# Patient Record
Sex: FEMALE | Race: WHITE | NOT HISPANIC OR LATINO | Employment: FULL TIME | URBAN - METROPOLITAN AREA
[De-identification: names, ages, dates, MRNs, and addresses within clinical notes are randomized per-mention and may not be internally consistent; named-entity substitution may affect disease eponyms.]

---

## 2021-01-12 ENCOUNTER — IMMUNIZATION (OUTPATIENT)
Dept: VACCINE CLINIC | Facility: HOSPITAL | Age: 36
End: 2021-01-12

## 2021-01-12 PROCEDURE — 0001A: CPT | Performed by: INTERNAL MEDICINE

## 2021-01-12 PROCEDURE — 91300 HC SARSCOV02 VAC 30MCG/0.3ML IM: CPT | Performed by: INTERNAL MEDICINE

## 2021-02-02 ENCOUNTER — IMMUNIZATION (OUTPATIENT)
Dept: VACCINE CLINIC | Facility: HOSPITAL | Age: 36
End: 2021-02-02

## 2021-02-02 PROCEDURE — 91300 HC SARSCOV02 VAC 30MCG/0.3ML IM: CPT | Performed by: INTERNAL MEDICINE

## 2021-02-02 PROCEDURE — 0002A: CPT | Performed by: INTERNAL MEDICINE

## 2022-05-11 ENCOUNTER — TRANSCRIBE ORDERS (OUTPATIENT)
Dept: ADMINISTRATIVE | Facility: HOSPITAL | Age: 37
End: 2022-05-11

## 2022-05-11 DIAGNOSIS — M85.80 OSTEOPENIA DETERMINED BY X-RAY: Primary | ICD-10-CM

## 2022-10-17 ENCOUNTER — LAB (OUTPATIENT)
Dept: LAB | Facility: HOSPITAL | Age: 37
End: 2022-10-17

## 2022-10-17 DIAGNOSIS — O09.522 SUPERVISION OF ELDERLY MULTIGRAVIDA IN SECOND TRIMESTER: Primary | ICD-10-CM

## 2022-10-17 PROCEDURE — 36415 COLL VENOUS BLD VENIPUNCTURE: CPT

## 2022-10-18 LAB — REF LAB TEST METHOD: NORMAL

## 2022-11-15 ENCOUNTER — APPOINTMENT (OUTPATIENT)
Dept: BONE DENSITY | Facility: HOSPITAL | Age: 37
End: 2022-11-15

## 2023-11-20 ENCOUNTER — HOSPITAL ENCOUNTER (OUTPATIENT)
Facility: HOSPITAL | Age: 38
Discharge: HOME | End: 2023-11-20
Payer: COMMERCIAL

## 2023-11-20 ENCOUNTER — PATIENT ROUNDING (BHMG ONLY) (OUTPATIENT)
Dept: INTERNAL MEDICINE | Facility: CLINIC | Age: 38
End: 2023-11-20
Payer: COMMERCIAL

## 2023-11-20 ENCOUNTER — OFFICE VISIT (OUTPATIENT)
Dept: INTERNAL MEDICINE | Facility: CLINIC | Age: 38
End: 2023-11-20
Payer: COMMERCIAL

## 2023-11-20 VITALS
HEIGHT: 65 IN | DIASTOLIC BLOOD PRESSURE: 80 MMHG | SYSTOLIC BLOOD PRESSURE: 122 MMHG | BODY MASS INDEX: 23.26 KG/M2 | HEART RATE: 72 BPM | WEIGHT: 139.6 LBS | OXYGEN SATURATION: 98 % | TEMPERATURE: 96.9 F

## 2023-11-20 DIAGNOSIS — R10.2 PELVIC PAIN: ICD-10-CM

## 2023-11-20 DIAGNOSIS — K59.00 CONSTIPATION, UNSPECIFIED CONSTIPATION TYPE: Primary | ICD-10-CM

## 2023-11-20 RX ORDER — CHLORCYCLIZINE HYDROCHLORIDE AND PSEUDOEPHEDRINE HYDROCHLORIDE 25; 60 MG/1; MG/1
TABLET ORAL
COMMUNITY
Start: 2023-10-19

## 2023-11-20 RX ORDER — FLUOROURACIL 50 MG/G
CREAM TOPICAL
COMMUNITY
Start: 2023-10-05

## 2023-11-20 RX ORDER — NORGESTIMATE AND ETHINYL ESTRADIOL 7DAYSX3 28
KIT ORAL
COMMUNITY
Start: 2022-12-16

## 2023-11-20 RX ORDER — LORATADINE 10 MG/1
10 TABLET ORAL DAILY
COMMUNITY

## 2023-11-20 NOTE — PROGRESS NOTES
"  New Patient Office Visit      Patient Name: Dalia Herndon  : 1985   MRN: [id number]   Care Team: Patient Care Team:  Bj Sinclair MD as PCP - General (Internal Medicine)    Chief Complaint:    Chief Complaint   Patient presents with    Constipation       History of Present Illness: Dalia Herndon is a 38 y.o. female who is here today as a new patient establishing care for chronic constipation    She states that for about the past year she has noticed worsening intermittent constipation with associated abdominal and pelvic pain.  She reports that has slowly been worsening and she has been taking MiraLAX, Dulcolax with minimal improvement.  She does report of some associated nausea and abdominal pain/indigestion that occurs with it.  She states that she can go a couple days without having a bowel movement and reports feeling minimal improvement after having the bowel movement.  She also reports that she feels that she could have more stool come out but is not able to strain and produce more. She denies any blood in stool, melena, but does have some pain with straining.     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 CMP, CBC, VitD, TSH, uric acid, GGT, Magnesium all wnl. A1c noted at 5.4%. She was able to schedule a colonoscopy given complaints above for 23 with [facility]. Additionally, she had a DEXA scan after she states she had an abnormal plain film of her back showing \"inverted sacrum\" and DEXA showed osteopenia.     PMH is notable for multiple skin cancers which she has had removed years past and currently has a Efudex cream for residual skin cancer. Denies any pertinent past surgical history. Family hx notable for breast cancer in mother, no family hx of autoimmune diseases or known cancers         Subjective      Review of Systems:   Review of Systems "   Constitutional:  Negative for fever, unexpected weight gain and unexpected weight loss.   Gastrointestinal:  Positive for abdominal distention, abdominal pain, constipation, nausea and rectal pain. Negative for anal bleeding, blood in stool, diarrhea and vomiting.   Genitourinary:  Negative for hematuria, menstrual problem, vaginal bleeding and vaginal discharge.   Musculoskeletal:  Positive for back pain.   Skin:         +hx of skin cancer    - See HPI    Past Medical History: History reviewed. No pertinent past medical history.    Past Surgical History:   Past Surgical History:   Procedure Laterality Date    TONSILLECTOMY  1989       Family History:   Family History   Problem Relation Age of Onset    Cancer Mother         Breast cancer    Miscarriages / Stillbirths Mother     Miscarriages / Stillbirths Maternal Grandmother        Social History:   Social History     Socioeconomic History    Marital status:    Tobacco Use    Smoking status: Never     Passive exposure: Never    Smokeless tobacco: Never   Vaping Use    Vaping Use: Never used   Substance and Sexual Activity    Alcohol use: Yes    Drug use: Never    Sexual activity: Yes     Partners: Male     Birth control/protection: Birth control pill, Same-sex partner       Tobacco History:   Social History     Tobacco Use   Smoking Status Never    Passive exposure: Never   Smokeless Tobacco Never       Medications:     Current Outpatient Medications:     fluorouracil (EFUDEX) 5 % cream, , Disp: , Rfl:     loratadine (CLARITIN) 10 MG tablet, Take 1 tablet by mouth Daily., Disp: , Rfl:     Stahist AD 25-60 MG tablet, 1 TABLET AS NEEDED ORALLY EVERY 8 HOURS AS NEEDED 30 DAYS, Disp: , Rfl:     Tri-Estarylla 0.18/0.215/0.25 MG-35 MCG per tablet, , Disp: , Rfl:     Allergies:   No Known Allergies    Objective     Physical Exam:  Vital Signs:   Vitals:    11/20/23 1548   BP: 122/80   Pulse: 72   Temp: 96.9 °F (36.1 °C)   TempSrc: Temporal   SpO2: 98%   Weight:  "63.3 kg (139 lb 9.6 oz)   Height: 165.1 cm (65\")     Body mass index is 23.23 kg/m².     Physical Exam  Vitals reviewed.   Constitutional:       General: She is not in acute distress.     Appearance: Normal appearance.   HENT:      Head: Normocephalic and atraumatic.   Eyes:      General:         Right eye: No discharge.         Left eye: No discharge.      Extraocular Movements: Extraocular movements intact.      Conjunctiva/sclera: Conjunctivae normal.   Cardiovascular:      Rate and Rhythm: Normal rate and regular rhythm.      Heart sounds: Normal heart sounds.   Pulmonary:      Effort: Pulmonary effort is normal. No respiratory distress.      Breath sounds: Normal breath sounds.   Abdominal:      General: Abdomen is flat. Bowel sounds are normal. There is no distension.      Palpations: Abdomen is soft. There is no mass.      Tenderness: There is no abdominal tenderness. There is no right CVA tenderness or left CVA tenderness.   Musculoskeletal:         General: No swelling or deformity. Normal range of motion.      Cervical back: Normal range of motion and neck supple.   Skin:     General: Skin is warm and dry.   Neurological:      General: No focal deficit present.      Mental Status: She is alert and oriented to person, place, and time.      Motor: No weakness.   Psychiatric:         Mood and Affect: Mood normal.         Behavior: Behavior normal.         Assessment / Plan      Assessment/Plan:   Problems Addressed This Visit  Diagnoses and all orders for this visit:    1. Constipation, unspecified constipation type (Primary)  -     Comprehensive Metabolic Panel  -     CBC & Differential  -     TSH Rfx On Abnormal To Free T4    2. Pelvic pain  -     Cancel: XR Pelvis 3+ View  -     Sedimentation rate, automated  -     C-reactive protein  -     XR Pelvis 1 or 2 View      Presents with 1yr hx of worsening constipation with associated nausea, unlocalized abdominal pain refractory to OTC laxatives with no other " red flag sx/s. Review of outside labs show no signs of thyroid dysfunction, T2DM, electrolyte abnormalities. Hs-CRP was elevated however unclear significance of this. Counseled to increase fiber intake, continue daily laxatives. Agreed with pursuing colonoscopy which is already scheduled and will f/u results    Will plan to repeat CMP, CBC and TSH today and will additionally obtain ESR/CRP. She had plain films that demonstrated sacral abnormalities and subsequently had DEXA that showed osteopenia with no obvious explanation given her age and absent risk factors for developing so. Will repeat plain films today to evaluate for spondyloarthritis as well as inflammatory markers above for possible IBD as etiology as she mentioned previously reported episode of back/pelvic pain.     F/u in 1mo, if ultimately negative will consider CT imaging or referral to GI     Plan of care reviewed with patient at the conclusion of today's visit. Education was provided regarding diagnosis and management.  Patient verbalizes understanding of and agreement with management plan.      Follow Up:   Return in about 1 month (around 12/20/2023).          MD [facility]  [address] [place] [phone]  Fax [phone]

## 2023-11-20 NOTE — PROGRESS NOTES
November 20, 2023    Hello, may I speak with Dalia Herndon?    My name is Shade      I am  with [facility]  [address] [address]  [facility] [id number].    Before we get started may I verify your date of birth? 1985    I am calling to officially welcome you to our practice and ask about your recent visit. Is this a good time to talk? yes    Tell me about your visit with us. What things went well?  everything       We're always looking for ways to make our patients' experiences even better. Do you have recommendations on ways we may improve?  no    Overall were you satisfied with your first visit to our practice? yes       I appreciate you taking the time to speak with me today. Is there anything else I can do for you? no      Thank you, and have a great day.

## 2023-12-20 ENCOUNTER — OFFICE VISIT (OUTPATIENT)
Dept: INTERNAL MEDICINE | Facility: CLINIC | Age: 38
End: 2023-12-20
Payer: COMMERCIAL

## 2023-12-20 VITALS
OXYGEN SATURATION: 98 % | DIASTOLIC BLOOD PRESSURE: 70 MMHG | HEIGHT: 65 IN | WEIGHT: 137.8 LBS | BODY MASS INDEX: 22.96 KG/M2 | TEMPERATURE: 98.2 F | HEART RATE: 73 BPM | SYSTOLIC BLOOD PRESSURE: 122 MMHG

## 2023-12-20 DIAGNOSIS — N81.6 RECTOCELE: Primary | ICD-10-CM

## 2023-12-20 DIAGNOSIS — K64.8 INTERNAL HEMORRHOIDS: ICD-10-CM

## 2023-12-20 PROCEDURE — 99213 OFFICE O/P EST LOW 20 MIN: CPT | Performed by: STUDENT IN AN ORGANIZED HEALTH CARE EDUCATION/TRAINING PROGRAM

## 2023-12-20 NOTE — PROGRESS NOTES
"Chief Complaint  Rectal Prolapse and Hemorrhoids    Subjective        Dalia Herndon presents to [facility]  History of Present Illness    Ms. Herndon presents to clinic today for follow up of change in bowel habits after recent colonoscopy performed.    She had colonoscopy performed since last visit and was noted to have rectocele as well as internal hemorrhoids. The GI doctor at that time recommended having surgical evaluation for the rectocele in which she is interested in.       Review of Systems   Gastrointestinal:  Positive for constipation and rectal pain. Negative for abdominal pain, diarrhea and vomiting.        Objective   Vital Signs:  /70   Pulse 73   Temp 98.2 °F (36.8 °C) (Temporal)   Ht 165.1 cm (65\")   Wt 62.5 kg (137 lb 12.8 oz)   SpO2 98%   BMI 22.93 kg/m²   Estimated body mass index is 22.93 kg/m² as calculated from the following:    Height as of this encounter: 165.1 cm (65\").    Weight as of this encounter: 62.5 kg (137 lb 12.8 oz).       BMI is within normal parameters. No other follow-up for BMI required.      Physical Exam  Vitals reviewed.   Constitutional:       Appearance: Normal appearance. She is not ill-appearing.   HENT:      Head: Normocephalic and atraumatic.   Eyes:      General: No scleral icterus.     Extraocular Movements: Extraocular movements intact.      Conjunctiva/sclera: Conjunctivae normal.   Pulmonary:      Effort: Pulmonary effort is normal. No respiratory distress.   Musculoskeletal:         General: No swelling or deformity. Normal range of motion.   Skin:     General: Skin is warm and dry.   Neurological:      General: No focal deficit present.      Mental Status: She is alert and oriented to person, place, and time.      Motor: No weakness.      Gait: Gait normal.   Psychiatric:         Mood and Affect: Mood normal.         Behavior: Behavior normal.        Result Review :                   Assessment and Plan "   Diagnoses and all orders for this visit:    1. Rectocele (Primary)  -     Ambulatory Referral to Colorectal Surgery  -     Ambulatory Referral to Physical Therapy Pelvic Floor    2. Internal hemorrhoids  -     Ambulatory Referral to Colorectal Surgery      Reviewed recent colonoscopy and labs obtained at outside facility, scanned in.    Recent c-scope noted rectocele and internal hemorrhoids. She is interested in surgical intervention given her age so will refer to colorectal for possible surgical intervention. Will also refer to PT for pelvic floor    Labs show no signs of anemia, counseled on increasing fiber supplement, staying hydrated for internal hemorrhoid treatment. Referral to colorectal            Follow Up   Return in about 4 months (around 4/20/2024).  Patient was given instructions and counseling regarding her condition or for health maintenance advice. Please see specific information pulled into the AVS if appropriate.

## 2023-12-29 DIAGNOSIS — N81.6 RECTOCELE: Primary | ICD-10-CM

## 2024-02-12 ENCOUNTER — OFFICE VISIT (OUTPATIENT)
Dept: SURGERY | Facility: CLINIC | Age: 39
End: 2024-02-12
Payer: COMMERCIAL

## 2024-02-12 VITALS
HEART RATE: 77 BPM | WEIGHT: 139 LBS | HEIGHT: 65 IN | SYSTOLIC BLOOD PRESSURE: 118 MMHG | OXYGEN SATURATION: 99 % | DIASTOLIC BLOOD PRESSURE: 70 MMHG | BODY MASS INDEX: 23.16 KG/M2

## 2024-02-12 DIAGNOSIS — K59.09 OTHER CONSTIPATION: Primary | ICD-10-CM

## 2024-02-12 DIAGNOSIS — N81.6 RECTOCELE: ICD-10-CM

## 2024-02-12 DIAGNOSIS — K60.2 ANAL FISSURE: ICD-10-CM

## 2024-02-16 ENCOUNTER — PATIENT ROUNDING (BHMG ONLY) (OUTPATIENT)
Dept: SURGERY | Facility: CLINIC | Age: 39
End: 2024-02-16
Payer: COMMERCIAL

## 2024-04-17 ENCOUNTER — OFFICE VISIT (OUTPATIENT)
Dept: INTERNAL MEDICINE | Facility: CLINIC | Age: 39
End: 2024-04-17
Payer: COMMERCIAL

## 2024-04-17 VITALS
HEART RATE: 62 BPM | HEIGHT: 65 IN | SYSTOLIC BLOOD PRESSURE: 108 MMHG | DIASTOLIC BLOOD PRESSURE: 82 MMHG | OXYGEN SATURATION: 95 % | WEIGHT: 139.2 LBS | BODY MASS INDEX: 23.19 KG/M2 | TEMPERATURE: 97.8 F

## 2024-04-17 DIAGNOSIS — N81.6 RECTOCELE: ICD-10-CM

## 2024-04-17 DIAGNOSIS — K59.00 CONSTIPATION, UNSPECIFIED CONSTIPATION TYPE: Primary | ICD-10-CM

## 2024-04-17 PROCEDURE — 99213 OFFICE O/P EST LOW 20 MIN: CPT | Performed by: STUDENT IN AN ORGANIZED HEALTH CARE EDUCATION/TRAINING PROGRAM

## 2024-04-17 RX ORDER — HYDROCORTISONE 25 MG/G
CREAM TOPICAL
COMMUNITY
Start: 2023-12-06

## 2024-04-17 RX ORDER — FAMOTIDINE 20 MG
TABLET ORAL
COMMUNITY

## 2024-04-17 NOTE — PROGRESS NOTES
"Chief Complaint  Constipation and Rectal Prolapse    Subjective        Dalia Vishnu Herndon presents to [facility]  History of Present Illness    Ms. Herndon presents to clinic today for follow-up of chronic constipation and rectocele    She reports she was recently seen by urogynecology at [place] who ultimately did not recommend surgical intervention at this time.  She has instead been attending pelvic floor physical therapy which has been helpful and beneficial    She also established with colorectal here at [facility] and was noted to have anal fissure in which topical lidocaine cream was prescribed.  She has been taking fiber supplement daily as well as taking a magnesium supplement (Magnesium Calm(?)) per the recommendations of her urogyenocologist and has had improvement.       Review of Systems   Constitutional:  Negative for fever.   Gastrointestinal:  Positive for constipation and rectal pain. Negative for blood in stool.        Objective   Vital Signs:  /82   Pulse 62   Temp 97.8 °F (36.6 °C) (Temporal)   Ht 165.1 cm (65\")   Wt 63.1 kg (139 lb 3.2 oz)   SpO2 95%   BMI 23.16 kg/m²   Estimated body mass index is 23.16 kg/m² as calculated from the following:    Height as of this encounter: 165.1 cm (65\").    Weight as of this encounter: 63.1 kg (139 lb 3.2 oz).       BMI is within normal parameters. No other follow-up for BMI required.      Physical Exam  Vitals reviewed.   Constitutional:       General: She is not in acute distress.     Appearance: Normal appearance. She is not toxic-appearing.   HENT:      Head: Normocephalic and atraumatic.   Eyes:      General: No scleral icterus.     Conjunctiva/sclera: Conjunctivae normal.   Abdominal:      General: Abdomen is flat. There is no distension.      Palpations: Abdomen is soft.   Skin:     General: Skin is warm and dry.   Neurological:      Mental Status: She is alert and oriented to person, place, and time. "   Psychiatric:         Mood and Affect: Mood normal.         Behavior: Behavior normal.        Result Review :                   Assessment and Plan   Diagnoses and all orders for this visit:    1. Constipation, unspecified constipation type (Primary)    2. Rectocele      Reviewed outside urogyenocology and colorectal OV since last visit with me    Constipation is improving with daily fiber supplementation and varoius laxatives and supplements. Discussed staying hydrated and that I can't definitively say supplement is beneficial given unclear ingredients but I suspect it should appropriate to take and without significant risks.     Rectocele is improving with pelvic floor PT. Appears to also have some anterior prolapse which is exacerbating above. Continue f/w urogynecology and pelvic floor PT         Follow Up   Return in about 6 months (around 10/17/2024) for Annual physical.  Patient was given instructions and counseling regarding her condition or for health maintenance advice. Please see specific information pulled into the AVS if appropriate.

## 2024-11-19 ENCOUNTER — OFFICE VISIT (OUTPATIENT)
Dept: INTERNAL MEDICINE | Facility: CLINIC | Age: 39
End: 2024-11-19
Payer: COMMERCIAL

## 2024-11-19 VITALS
HEART RATE: 59 BPM | WEIGHT: 137.4 LBS | TEMPERATURE: 97.3 F | OXYGEN SATURATION: 99 % | DIASTOLIC BLOOD PRESSURE: 84 MMHG | BODY MASS INDEX: 22.89 KG/M2 | SYSTOLIC BLOOD PRESSURE: 122 MMHG | HEIGHT: 65 IN

## 2024-11-19 DIAGNOSIS — Z00.00 ANNUAL PHYSICAL EXAM: Primary | ICD-10-CM

## 2024-11-19 DIAGNOSIS — R74.8 ELEVATED ALKALINE PHOSPHATASE LEVEL: ICD-10-CM

## 2024-11-19 PROCEDURE — 99396 PREV VISIT EST AGE 40-64: CPT | Performed by: STUDENT IN AN ORGANIZED HEALTH CARE EDUCATION/TRAINING PROGRAM

## 2024-11-19 NOTE — PROGRESS NOTES
"Chief Complaint  Annual Exam    Subjective        Dalia Herndon presents to [facility]  History of Present Illness    Presents to clinic today for CPE    She had labs done at outside lab and brought in. All look reassuring with no signs of anemia, did note elevated LDL at 112. Otherwise she has been doing well     Review of Systems   Constitutional:  Negative for appetite change, fever and unexpected weight change.   HENT:  Negative for congestion, postnasal drip and rhinorrhea.    Eyes:  Negative for photophobia and visual disturbance.   Respiratory:  Negative for chest tightness and shortness of breath.    Cardiovascular:  Negative for chest pain and palpitations.   Gastrointestinal:  Negative for abdominal pain, blood in stool, diarrhea and vomiting.   Endocrine: Negative for polydipsia and polyuria.   Genitourinary:  Negative for dysuria, flank pain and hematuria.   Musculoskeletal:  Positive for arthralgias (hip pain). Negative for gait problem.   Skin:  Negative for rash and wound.   Neurological:  Negative for seizures, syncope, weakness and headaches.   Psychiatric/Behavioral:  Negative for sleep disturbance and suicidal ideas. The patient is not nervous/anxious.        Objective   Vital Signs:  /84   Pulse 59   Temp 97.3 °F (36.3 °C) (Temporal)   Ht 165.1 cm (65\")   Wt 62.3 kg (137 lb 6.4 oz)   SpO2 99%   BMI 22.86 kg/m²   Estimated body mass index is 22.86 kg/m² as calculated from the following:    Height as of this encounter: 165.1 cm (65\").    Weight as of this encounter: 62.3 kg (137 lb 6.4 oz).    BMI is within normal parameters. No other follow-up for BMI required.      Physical Exam  Vitals reviewed.   Constitutional:       General: She is not in acute distress.     Appearance: Normal appearance. She is not toxic-appearing.   HENT:      Head: Normocephalic and atraumatic.      Nose: No congestion.   Eyes:      General: No scleral icterus.     " Conjunctiva/sclera: Conjunctivae normal.   Cardiovascular:      Rate and Rhythm: Normal rate and regular rhythm.      Heart sounds: Normal heart sounds.   Pulmonary:      Effort: Pulmonary effort is normal. No respiratory distress.      Breath sounds: Normal breath sounds. No wheezing.   Abdominal:      General: Abdomen is flat.      Palpations: Abdomen is soft.      Tenderness: There is no guarding.   Skin:     General: Skin is warm and dry.   Neurological:      Mental Status: She is alert and oriented to person, place, and time.      Motor: No weakness.      Gait: Gait normal.   Psychiatric:         Mood and Affect: Mood normal.         Behavior: Behavior normal.        Result Review :                Assessment and Plan   Diagnoses and all orders for this visit:    1. Annual physical exam (Primary)    2. Elevated alkaline phosphatase level      Immunizations: due for flu, she will get at her work  Cancer screening: UTD, f/w OBGYN   Metabolic Labs: UTD, scanned in  Recommend maintaining a healthy lifestyle, rich in whole grains, fruits and vegetables. Limit high saturated fats and processed sugars. Maintain an active lifestyle with goal of 150 min of moderate aerobic exercise per week      Will plan just to touch base with her in 6mo, she will have repeat CMP lab drawn to repeat alk phos which was noted to be mildly elevated at around 140.    HLD is chronic and new, recommend heart-healthy diet rich in fruits and vegetables, whole grains, and lean proteins. Limit carbohydrates and saturated fats as these can elevate your cholesterol and exercise regularly      RTC in 6mo or sooner prn        Follow Up   Return in about 6 months (around 5/19/2025).  Patient was given instructions and counseling regarding her condition or for health maintenance advice. Please see specific information pulled into the AVS if appropriate.

## 2024-11-22 ENCOUNTER — TELEPHONE (OUTPATIENT)
Dept: SURGERY | Facility: CLINIC | Age: 39
End: 2024-11-22
Payer: COMMERCIAL
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
## 2024-12-05 ENCOUNTER — TELEPHONE (OUTPATIENT)
Dept: INTERNAL MEDICINE | Facility: CLINIC | Age: 39
End: 2024-12-05
Payer: COMMERCIAL
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
## 2025-02-06 NOTE — PROGRESS NOTES
[facility]     Referring Provider: Bj Sinclair MD     Chief complaint: management of breast cancer risk     HPI: Ms. Dalia Herndon is a 38 yo woman, seen at the request of Bj Sinclair MD, for management of breast cancer risk    I personally reviewed her records and summarized her relevant breast history/imaging:    10/15/2024 bilateral screening mammogram at all women  The breasts are heterogeneously dense, which may obscure small masses.  No suspicious masses, significant calcifications or other abnormalities are seen.  IMPRESSION:  There is no mammographic evidence of malignancy.  Screening mammogram in 1 year is recommended.  BI-RADS Category 1         She has a family history of breast cancer in her mother dx age 49.  She denies any family history of ovarian cancer.     Today she presents with concerns regarding family history of breast cancer in her mother and if she is high risk.  She denies any breast lumps, pain, skin changes, or nipple discharge.  She denies any prior history of abnormal mammograms or breast biopsies.       Review of Systems - Oncology    Medications:    Current Outpatient Medications:     fluorouracil (EFUDEX) 5 % cream, , Disp: , Rfl:     Hydrocortisone, Perianal, (ANUSOL-HC) 2.5 % rectal cream, 1 APPLICATION EXTERNALLY TWICE A DAY 30 DAYS, Disp: , Rfl:     loratadine (CLARITIN) 10 MG tablet, Take 1 tablet by mouth Daily., Disp: , Rfl:     Magnesium Carbonate 250 MG/GM powder, Take  by mouth. (Patient not taking: Reported on 11/19/2024), Disp: , Rfl:     Stahist AD 25-60 MG tablet, , Disp: , Rfl:     Tri-Estarylla 0.18/0.215/0.25 MG-35 MCG per tablet, , Disp: , Rfl:     Vitamin D, Cholecalciferol, 25 MCG (1000 UT) capsule, Take  by mouth., Disp: , Rfl:     Allergies:  No Known Allergies    Medical history:  No past medical history on file.    Surgical History:  Past Surgical History:   Procedure Laterality Date    TONSILLECTOMY  1989       Family History:  Family  History   Problem Relation Age of Onset    Cancer Mother         Breast cancer    Miscarriages / Stillbirths Mother     No Known Problems Father     Miscarriages / Stillbirths Maternal Grandmother        Social History:   Social History     Socioeconomic History    Marital status:    Tobacco Use    Smoking status: Never     Passive exposure: Never    Smokeless tobacco: Never   Vaping Use    Vaping status: Never Used   Substance and Sexual Activity    Alcohol use: Yes    Drug use: Never    Sexual activity: Yes     Partners: Male     Birth control/protection: Birth control pill, Partner of same sex     Patient drinks 2 servings of caffeine per day.       GYNECOLOGIC HISTORY:   . P: 1. AB: 3.  Last menstrual period: 2025  Age at menarche: 13  Age at first childbirth: 34  Lactation/How lon months  Age at menopause: N/A  Total years of oral contraceptive use: 19 years  Total years of hormone replacement therapy: N/A      Physical Exam  There were no vitals filed for this visit.  ECOG 0 - Asymptomatic  General: NAD, well appearing  Psych: a&o x 3, normal mood and affect  Eyes: EOMI, no scleral icterus  ENMT: neck supple without masses or thyromegaly, mucus membranes moist  Resp: normal effort, CTAB  CV: RRR, no murmurs, no edema   GI: soft, NT, ND  MSK: normal gait, normal ROM in bilateral shoulders  Lymph nodes: no cervical, supraclavicular or axillary lymphadenopathy  Breast: symmetric, small  Right: No visible abnormalities on inspection while seated, with arms raised or hands on hips. No masses, skin changes, or nipple abnormalities.  Left: No visible abnormalities on inspection while seated, with arms raised or hands on hips. No masses, skin changes, or nipple abnormalities.      Assessment:    Family history of breast cancer  High risk for breast cancer-according to [facility] is a lifetime risk of 22.8%  Dense breast    Discussion:  We discussed management options for individuals who are at increased  risk (>20% lifetime risk):  1) High risk screening - Annual mammogram and annual breast MRI, alternating one test every 6 months, biannual clinical exam and monthly self breast exam. She meets criteria for high risk screening.  2) Chemoprevention with Tamoxifen, Raloxifene or Exemestane. These may reduce risk up to 50%. I reviewed that these particular medications are not without risks and the risk/benefit ratio must be considered carefully. She is not interested in this currently.  3) Risk reducing surgery such as prophylactic mastectomy  which may reduce risk by 90-95%. We discussed that this is a relatively radical strategy and is generally reserved for individuals with a known genetic mutation predisposing them to an increased risk (~50% risk) of breast cancer. She does not meet criteria for risk reducing surgery.   4) I discussed the importance of exercise and weight management as part of a risk reducing strategy, since increased BMI is associated with an increased risk of breast cancer. This is especially true in her case, as I expect her risk would decrease as she lost weight.  2. Breast density describes how the breasts look on a mammogram.  Breast and connective tissue are denser than fat and this difference shows up on the mammogram.  Young women often have dense breasts.  As we age, breast become less dense.  Dense breast can make it harder to find breast cancer on the mammogram.  Women with high breast density have an increased risk of breast cancer.  Educational materials regarding breast density were given and reviewed.  Tomosynthesis imaging will be completed with next screening study.  3. In reviewing the patient's personal and family history of cancer, including the number, types and age of diagnosis, we found that the patient is eligible for genetic testing based on the National comprehensive cancer network (NCCN) guidelines.  The patient is aware that testing may reveal a pathogenic variant in any  1 of several genes, and that a pathogenic variant can markedly increase the risk of variety of cancers.  The types of cancers in the level of risk is dependent on which gene is affected.  The patient is also aware that we may find no pathogenic variants or that we could find what is called a variant of uncertain significance(VUS), which at this point in time has no direct impact on medical care.  In either these cases, risk would be determined by the family history alone, which would determine further management.  The patient knows that if we find a pathogenic variant, that her family members are also at risk and they should be informed of this information.    The purpose of the test is to identify whether you carry a gene variant (mutation) that might increase your risk of developing cancer or other diseases.    The reliability of positive or negative test results and the level of certainty that a positive test result for that disease or condition serves as a predictor of such disease: This test is not perfect.  If you carry a gene variant (mutation) and a cancer causing gene and increases your risk of cancer but does not mean you develop cancer.  Also, if you do not carry a gene variant (mutation), it does not mean you will not get cancer.  This test will help us better manage you in order to help find cancer earlier or prevent it.  If you develop or have cancer, it may help us better treat that disease.    If you do carry a pathogenic gene variant (mutation), you will be given a management strategy to help minimize your risk and we will discuss what it means for you.  You are being tested for multiple genes, and each has its own set of cancers that it predisposes to in each cancer gene combination shows a different level of risk.  If you carry a gene variant(mutation) we have discussed the specific risk of cancer for that gene and arrange management that minimizes your future risk.    You may also be found to  have a change in imaging that we do not understand (variant of uncertain significance (VUS)).  We will let you know and then manage this is a negative result.  Most of these turn out to be benign as we learn more.     For those patients who do not have cancer, a positive test can compromise the ability to obtain health and disability insurance, so your coverage should be satisfactory to you prior to testing.    Cost: Your insurance will likely cover the cost of genetic testing.  If you are insured denies payment, the testing lab will contact you to see if you are willing to pay out-of-pocket.  If so, the maximum cost is around $200-$250.  If you are not willing to pay in your insurance or denies payment, the test will be canceled.    The patient agreed to proceed with testing and a 74 gene [employer] panel was ordered.        Plan:  Genetic testing at next visit if her mother does not get gentics  MRI in April call with results  Mammo in oct followed by exam        TOMMIE Cantor    I have spent 45 mins in face to face time with the patient and in chart review.    CC:  MD Yahir Silva Conor, MD    EMR Dragon/transcription disclaimer:  Dictated using Dragon dictation

## 2025-02-07 ENCOUNTER — OFFICE VISIT (OUTPATIENT)
Dept: SURGERY | Facility: CLINIC | Age: 40
End: 2025-02-07
Payer: COMMERCIAL

## 2025-02-07 VITALS
HEART RATE: 101 BPM | WEIGHT: 135 LBS | DIASTOLIC BLOOD PRESSURE: 84 MMHG | OXYGEN SATURATION: 98 % | HEIGHT: 65 IN | BODY MASS INDEX: 22.49 KG/M2 | SYSTOLIC BLOOD PRESSURE: 130 MMHG

## 2025-02-07 DIAGNOSIS — Z91.89 AT HIGH RISK FOR BREAST CANCER: Primary | ICD-10-CM

## 2025-02-07 DIAGNOSIS — Z12.31 ENCOUNTER FOR SCREENING MAMMOGRAM FOR MALIGNANT NEOPLASM OF BREAST: ICD-10-CM

## 2025-02-07 RX ORDER — IMIQUIMOD 12.5 MG/.25G
CREAM TOPICAL
COMMUNITY
Start: 2025-01-08

## 2025-04-09 ENCOUNTER — HOSPITAL ENCOUNTER (OUTPATIENT)
Dept: MRI IMAGING | Facility: HOSPITAL | Age: 40
Discharge: HOME | End: 2025-04-09
Admitting: NURSE PRACTITIONER
Payer: COMMERCIAL

## 2025-04-09 DIAGNOSIS — Z91.89 AT HIGH RISK FOR BREAST CANCER: ICD-10-CM

## 2025-04-09 PROCEDURE — 77049 MRI BREAST C-+ W/CAD BI: CPT

## 2025-04-09 PROCEDURE — 25510000002 GADOBENATE DIMEGLUMINE 529 MG/ML SOLUTION: Performed by: NURSE PRACTITIONER

## 2025-04-09 PROCEDURE — A9577 INJ MULTIHANCE: HCPCS | Performed by: NURSE PRACTITIONER

## 2025-04-09 RX ADMIN — GADOBENATE DIMEGLUMINE 12 ML: 529 INJECTION, SOLUTION INTRAVENOUS at 16:23

## 2025-04-10 ENCOUNTER — RESULTS FOLLOW-UP (OUTPATIENT)
Dept: SURGERY | Facility: CLINIC | Age: 40
End: 2025-04-10
Payer: COMMERCIAL

## 2025-04-10 NOTE — TELEPHONE ENCOUNTER
----- Message from Madhav Willett sent at 4/10/2025 11:47 AM EDT -----  MRI is normal I will see her in October after her mammogram  ----- Message -----  From: Interface, Rad Results [employer] In  Sent: 4/10/2025   9:14 AM EDT  To: TOMMIE Cantor

## 2025-04-10 NOTE — TELEPHONE ENCOUNTER
Spoke to pt and let her know the following  MRI is normal I will see her in October after her mammogram   Pt stated understanding

## 2025-05-27 ENCOUNTER — OFFICE VISIT (OUTPATIENT)
Dept: INTERNAL MEDICINE | Facility: CLINIC | Age: 40
End: 2025-05-27
Payer: COMMERCIAL

## 2025-05-27 VITALS
BODY MASS INDEX: 23.32 KG/M2 | HEART RATE: 76 BPM | WEIGHT: 140 LBS | HEIGHT: 65 IN | OXYGEN SATURATION: 99 % | SYSTOLIC BLOOD PRESSURE: 126 MMHG | DIASTOLIC BLOOD PRESSURE: 80 MMHG

## 2025-05-27 DIAGNOSIS — K59.00 CONSTIPATION, UNSPECIFIED CONSTIPATION TYPE: ICD-10-CM

## 2025-05-27 DIAGNOSIS — R74.8 ELEVATED ALKALINE PHOSPHATASE LEVEL: Primary | ICD-10-CM

## 2025-05-27 PROCEDURE — 99213 OFFICE O/P EST LOW 20 MIN: CPT | Performed by: STUDENT IN AN ORGANIZED HEALTH CARE EDUCATION/TRAINING PROGRAM

## 2025-05-27 NOTE — PROGRESS NOTES
"Chief Complaint  Follow-up (6 month follow up.)    Subjective        Dalia Herndon presents to [facility]  History of Present Illness    Presents to clinic today for follow-up    Overall she reports she is doing relatively well, has no acute complaints or concerns.  She recently established with a high risk breast cancer clinic, and has had MRI done in April that was unremarkable.  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  She has had no recent hospitalization or medication changes    Objective   Vital Signs:  /80   Pulse 76   Ht 165.1 cm (65\")   Wt 63.5 kg (140 lb)   SpO2 99%   BMI 23.30 kg/m²   Estimated body mass index is 23.3 kg/m² as calculated from the following:    Height as of this encounter: 165.1 cm (65\").    Weight as of this encounter: 63.5 kg (140 lb).    BMI is within normal parameters. No other follow-up for BMI required.      Physical Exam   Result Review :                Assessment and Plan   Diagnoses and all orders for this visit:    1. Elevated alkaline phosphatase level (Primary)    2. Constipation, unspecified constipation type      Reviewed outside labs obtained through her employer, breast surgery clinic    Alk phos normalzied    Lipids, CMP, CBC, TSH all wnl. Vitamin D borderline low however suspect this will improve with sunlight exposure    Ultimately very healthy, continue healthy dietary habits and exercise regularly      RTC in 1y for CPE, labs obtained through employer           Follow Up   Return in about 1 year (around 5/27/2026) for Annual physical.  Patient was given instructions and counseling regarding her condition or for health maintenance advice. Please see specific information pulled into the AVS if appropriate.             "